# Patient Record
Sex: FEMALE | Race: WHITE | NOT HISPANIC OR LATINO | URBAN - METROPOLITAN AREA
[De-identification: names, ages, dates, MRNs, and addresses within clinical notes are randomized per-mention and may not be internally consistent; named-entity substitution may affect disease eponyms.]

---

## 2017-09-03 ENCOUNTER — EMERGENCY (EMERGENCY)
Facility: HOSPITAL | Age: 67
LOS: 1 days | Discharge: HOME | End: 2017-09-03
Attending: EMERGENCY MEDICINE | Admitting: EMERGENCY MEDICINE
Payer: MEDICARE

## 2017-09-03 VITALS
DIASTOLIC BLOOD PRESSURE: 88 MMHG | SYSTOLIC BLOOD PRESSURE: 130 MMHG | RESPIRATION RATE: 16 BRPM | TEMPERATURE: 99 F | HEART RATE: 86 BPM | OXYGEN SATURATION: 97 %

## 2017-09-03 VITALS
HEART RATE: 82 BPM | SYSTOLIC BLOOD PRESSURE: 126 MMHG | RESPIRATION RATE: 16 BRPM | DIASTOLIC BLOOD PRESSURE: 76 MMHG | OXYGEN SATURATION: 97 %

## 2017-09-03 PROCEDURE — 99283 EMERGENCY DEPT VISIT LOW MDM: CPT

## 2017-09-03 RX ORDER — VALACYCLOVIR 500 MG/1
1000 TABLET, FILM COATED ORAL ONCE
Qty: 0 | Refills: 0 | Status: COMPLETED | OUTPATIENT
Start: 2017-09-03 | End: 2017-09-03

## 2017-09-03 RX ORDER — VALACYCLOVIR 500 MG/1
1 TABLET, FILM COATED ORAL
Qty: 21 | Refills: 0 | OUTPATIENT
Start: 2017-09-03 | End: 2017-09-10

## 2017-09-03 RX ADMIN — Medication 40 MILLIGRAM(S): at 16:12

## 2017-09-03 RX ADMIN — VALACYCLOVIR 1000 MILLIGRAM(S): 500 TABLET, FILM COATED ORAL at 16:12

## 2017-09-03 NOTE — ED PROVIDER NOTE - MEDICAL DECISION MAKING DETAILS
nontoxic appearing pt, w/ cluster of rash though nonvesicular, w/o oropharyngeal or TM involvement, no facial rash, pt subjectively felt similar to past shingles, afebrile, will start antiviral and prednisone and instructed pt to follow up with PMD within 48 hours or return to ED for repeat eval nontoxic appearing pt, w/ cluster of rash though nonvesicular, w/o oropharyngeal or TM involvement, no facial rash, pt subjectively felt similar to past shingles, afebrile, will start antiviral and prednisone and instructed pt to follow up with PMD within 48 hours or return to ED for repeat eval.  soft nontender abd w/ resolution of diarrhea, low suspicion for acute surgical abd path (hx of IBD), no indication for emergent abd imaging.

## 2017-09-03 NOTE — ED ADULT NURSE NOTE - OBJECTIVE STATEMENT
Patient is a 65 y/o female presenting to the ED with left sided rash. Patient has hx of herpes zoster virus. Patient reports itchy painful rash. Patient denies fever/chills. Patient in NAD, resting comfortably, and will continue to monitor.

## 2017-09-03 NOTE — ED PROVIDER NOTE - OBJECTIVE STATEMENT
66 yof pw rash to left arm and left flank, burning/irritating x 1 day, no fc, no new exposure or new medications other than zantec and immodium for diarrhea.  no abd pain, no fc.  reports stress.  hx of shingles, felt similar.  no bleeding.  sx: rash  a/w: no fever  duration: 1 day  location: L arm and flank  radiation: none  modifying/eliciting factors: none 66 yof pw rash to left arm and left flank, burning/irritating x 1 day, no fc, no new exposure or new medications other than zantec and immodium for diarrhea.  no abd pain, no fc.  reports stress.  hx of shingles, felt similar.  no bleeding.  also diarrhea since Friday but no abd pain, no vomiting.  sx: rash  a/w: no fever  duration: 1 day  location: L arm and flank  radiation: none  modifying/eliciting factors: none

## 2017-09-03 NOTE — ED PROVIDER NOTE - PHYSICAL EXAMINATION
CON: ao x 3, HENMT: clear oropharynx, soft neck, TM clear, HEAD: atraumatic, SKIN: maculopapular rash noted to L upper arm, and also small area of similar rash to L flank, no extending erythema, no petechiae, no vesicles (T1 and T 10), no contralateral rash noted, MSK: no edema, moving all extremities spontaneously, NEURO: no gross motor or sensory deficit

## 2017-09-07 DIAGNOSIS — Z79.899 OTHER LONG TERM (CURRENT) DRUG THERAPY: ICD-10-CM

## 2017-09-07 DIAGNOSIS — R21 RASH AND OTHER NONSPECIFIC SKIN ERUPTION: ICD-10-CM

## 2017-09-07 DIAGNOSIS — Z88.1 ALLERGY STATUS TO OTHER ANTIBIOTIC AGENTS STATUS: ICD-10-CM

## 2019-09-29 ENCOUNTER — TRANSCRIPTION ENCOUNTER (OUTPATIENT)
Age: 69
End: 2019-09-29

## 2019-12-31 PROBLEM — B02.9 ZOSTER WITHOUT COMPLICATIONS: Chronic | Status: ACTIVE | Noted: 2017-09-03

## 2020-01-02 PROBLEM — Z00.00 ENCOUNTER FOR PREVENTIVE HEALTH EXAMINATION: Status: ACTIVE | Noted: 2020-01-02

## 2020-01-07 ENCOUNTER — APPOINTMENT (OUTPATIENT)
Dept: OTOLARYNGOLOGY | Facility: CLINIC | Age: 70
End: 2020-01-07
Payer: SELF-PAY

## 2020-01-07 VITALS
SYSTOLIC BLOOD PRESSURE: 157 MMHG | DIASTOLIC BLOOD PRESSURE: 96 MMHG | TEMPERATURE: 98.3 F | OXYGEN SATURATION: 98 % | HEART RATE: 79 BPM

## 2020-01-07 VITALS — BODY MASS INDEX: 28.17 KG/M2 | WEIGHT: 165 LBS | HEIGHT: 64 IN

## 2020-01-07 DIAGNOSIS — H65.199 OTHER ACUTE NONSUPPURATIVE OTITIS MEDIA, UNSPECIFIED EAR: ICD-10-CM

## 2020-01-07 DIAGNOSIS — H93.8X9 OTHER SPECIFIED DISORDERS OF EAR, UNSPECIFIED EAR: ICD-10-CM

## 2020-01-07 DIAGNOSIS — Z87.09 PERSONAL HISTORY OF OTHER DISEASES OF THE RESPIRATORY SYSTEM: ICD-10-CM

## 2020-01-07 PROCEDURE — 31231 NASAL ENDOSCOPY DX: CPT

## 2020-01-07 PROCEDURE — 99203 OFFICE O/P NEW LOW 30 MIN: CPT | Mod: 25

## 2020-01-07 PROCEDURE — 92557 COMPREHENSIVE HEARING TEST: CPT

## 2020-01-07 PROCEDURE — 92550 TYMPANOMETRY & REFLEX THRESH: CPT

## 2020-01-07 RX ORDER — METHYLPREDNISOLONE 4 MG/1
4 TABLET ORAL
Qty: 1 | Refills: 0 | Status: ACTIVE | COMMUNITY
Start: 2020-01-07 | End: 1900-01-01

## 2020-01-07 RX ORDER — AMOXICILLIN 500 MG/1
500 TABLET, FILM COATED ORAL
Qty: 14 | Refills: 0 | Status: ACTIVE | COMMUNITY
Start: 2020-01-07 | End: 1900-01-01

## 2020-01-07 NOTE — REASON FOR VISIT
[Initial Evaluation] : an initial evaluation for [FreeTextEntry2] : URI and otitis Rt ear hearing loss for 2-3 wks

## 2020-01-07 NOTE — PHYSICAL EXAM
[de-identified] : URI and otitis Rt ear hearing loss for 2-3 wks [Midline] : trachea located in midline position [Normal] : mucosa is normal

## 2020-01-21 ENCOUNTER — APPOINTMENT (OUTPATIENT)
Dept: OTOLARYNGOLOGY | Facility: CLINIC | Age: 70
End: 2020-01-21
Payer: SELF-PAY

## 2020-01-21 VITALS
SYSTOLIC BLOOD PRESSURE: 160 MMHG | DIASTOLIC BLOOD PRESSURE: 109 MMHG | HEART RATE: 84 BPM | OXYGEN SATURATION: 96 % | TEMPERATURE: 99.1 F

## 2020-01-21 DIAGNOSIS — Z86.69 PERSONAL HISTORY OF OTHER DISEASES OF THE NERVOUS SYSTEM AND SENSE ORGANS: ICD-10-CM

## 2020-01-21 PROCEDURE — 92557 COMPREHENSIVE HEARING TEST: CPT

## 2020-01-21 PROCEDURE — 92550 TYMPANOMETRY & REFLEX THRESH: CPT

## 2020-01-21 PROCEDURE — 69433 CREATE EARDRUM OPENING: CPT | Mod: RT

## 2020-01-21 PROCEDURE — 99213 OFFICE O/P EST LOW 20 MIN: CPT | Mod: 25

## 2020-01-21 RX ORDER — CIPROFLOXACIN AND DEXAMETHASONE 3; 1 MG/ML; MG/ML
0.3-0.1 SUSPENSION/ DROPS AURICULAR (OTIC)
Qty: 1 | Refills: 0 | Status: ACTIVE | COMMUNITY
Start: 2020-01-21 | End: 1900-01-01

## 2020-01-21 NOTE — PHYSICAL EXAM
[Normal] : tympanic membranes are normal in both ears [de-identified] : right ear effusion and erythema

## 2020-01-21 NOTE — HISTORY OF PRESENT ILLNESS
[de-identified] : 69F previously seen for right ear otitis media. Patient reports completion of abx with no improvement of her ear fullness and decreased hearing.

## 2020-01-21 NOTE — ASSESSMENT
[FreeTextEntry1] : 69F previously seen for ear fullness, found to have otitis media not amenable to abx, audiogram showing persistent conductive hearing loss and effusion on exam, s/p right ear in office myringotomy.\par \par Plan:\par - ciprodex gtts TID\par - f/u 1 week

## 2020-01-21 NOTE — PROCEDURE
[FreeTextEntry1] : right ear myringotomy [FreeTextEntry2] : right ear persistent otitis media and effusion [FreeTextEntry3] : Patient consented for myringotomy. All risks, benefits and alternatives explained, all questions answered. Patient positioned. Microscope used to examine the TM. TM anesthetized and scalpel used to incise it. Myringotomy tube placed. Patient tolerated procedure well.  [] : M & T [FreeTextEntry5] : Rt MnT done

## 2020-03-24 ENCOUNTER — APPOINTMENT (OUTPATIENT)
Dept: OTOLARYNGOLOGY | Facility: CLINIC | Age: 70
End: 2020-03-24

## 2020-12-23 PROBLEM — Z86.69 HISTORY OF ACUTE OTITIS MEDIA: Status: RESOLVED | Noted: 2020-01-07 | Resolved: 2020-12-23

## 2021-12-11 ENCOUNTER — TRANSCRIPTION ENCOUNTER (OUTPATIENT)
Age: 71
End: 2021-12-11

## 2022-03-30 NOTE — ED ADULT NURSE NOTE - SKIN TEMPERATURE MOISTURE
HPI Notes    Name: Yohannes Guatemalan  : 1960         Chief Complaint:     Chief Complaint   Patient presents with    Knee Pain    Hip Pain       History of Present Illness:        Latonia Redman presents to office for evaluation of pain in the Right  Hip and both knees     States yesterday could hardly get out the bed due the pain. Works at [employer] in [place]. The pain is chronic and has been present for about 2-3 yrs. Has no h/o trauma . 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 Knee Pain    The incident occurred more than 1 week ago. There was no injury mechanism. The pain is present in the right knee and left knee. The quality of the pain is described as aching. The pain is at a severity of 8/10. The pain has been fluctuating since onset. Pertinent negatives include no inability to bear weight, loss of motion, loss of sensation or muscle weakness. She reports no foreign bodies present. The symptoms are aggravated by movement. She has tried NSAIDs for the symptoms. The treatment provided moderate relief. Hip Pain    The incident occurred more than 1 week ago. There was no injury mechanism. The pain is present in the right hip. The quality of the pain is described as aching. The pain is at a severity of 8/10. The pain has been intermittent since onset. Pertinent negatives include no inability to bear weight, loss of motion, loss of sensation or muscle weakness. She reports no foreign bodies present. She has tried NSAIDs for the symptoms. The treatment provided moderate relief.            Past Medical History:     Past Medical History:   Diagnosis Date    Diabetes mellitus ([address]     Hyperlipidemia     Hypertension       Reviewed all health maintenance requirements and orderedappropriate tests  Health Maintenance Due   Topic Date Due    Diabetic retinal exam  2019    Flu vaccine (1) 2020 DX E11.9 pt tests QID 10/26/17  Yes Emily Mendez MD   calcium carbonate (OSCAL) 500 MG TABS tablet Take 500 mg by mouth daily   Yes Historical Provider, MD   Multiple Vitamins-Minerals (THERAPEUTIC MULTIVITAMIN-MINERALS) tablet Take 1 tablet by mouth daily   Yes Historical Provider, MD   Blood Glucose Monitoring Suppl MILANA 1 Units by Does not apply route once for 1 dose DX-E11.9 10/26/17 10/26/17  Emily Mendez MD        Allergies:       Patient has no known allergies. Social History:     Tobacco: reports that she quit smoking about 22 years ago. She has a 10.00 pack-year smoking history. She has never used smokeless tobacco.  Alcohol:      reports no history of alcohol use. Drug Use:  reports no history of drug use. Family History:     Family History   Problem Relation Age of Onset    No Known Problems Mother     Diabetes Father        Review of Systems:         Review of Systems   Constitutional: Negative for chills and fever. HENT: Negative for congestion and sore throat. Respiratory: Negative for cough and shortness of breath. Cardiovascular: Negative for chest pain and palpitations. Gastrointestinal: Negative for abdominal pain and nausea. Genitourinary: Negative for dysuria. Musculoskeletal: Positive for arthralgias. Negative for gait problem, joint swelling, myalgias and neck pain. Skin: Negative for rash. Neurological: Negative for headaches. Psychiatric/Behavioral: The patient is not nervous/anxious. Physical Exam:     Vitals:  /81   Pulse 82   Resp 18   Ht 5' 4\" (1.626 m)   Wt 157 lb (71.2 kg)   SpO2 98%   BMI 26.95 kg/m²       Physical Exam  Vitals signs reviewed. Constitutional:       General: She is not in acute distress. Appearance: She is well-developed. HENT:      Head: Normocephalic and atraumatic. Nose: Nose normal. No congestion. Mouth/Throat:      Pharynx: Oropharynx is clear. No posterior oropharyngeal erythema.    Eyes: Orders   1. Chronic pain of both knees  XR KNEE RIGHT (3 VIEWS)    XR KNEE LEFT (3 VIEWS)   2. Pain of right hip joint  XR HIP 2-3 VW W PELVIS RIGHT     Ana M Vasquez with chronic BL knee pain and Right hip worsening in the last 2-3 wks. Will order BL knee and R hip X-ray for evaluation. Patient prefers to continue OTC prn Ibuprofen as it helps with the pain management. Will follow up after X-ray results available. Completed Refills   Requested Prescriptions      No prescriptions requested or ordered in this encounter     Return in about 2 weeks (around 10/13/2020), or knee pain,hip pain. No orders of the defined types were placed in this encounter. Orders Placed This Encounter   Procedures    XR KNEE RIGHT (3 VIEWS)     Standing Status:   Future     Standing Expiration Date:   9/29/2021    XR KNEE LEFT (3 VIEWS)     Standing Status:   Future     Standing Expiration Date:   9/29/2021    XR HIP 2-3 VW W PELVIS RIGHT     Standing Status:   Future     Standing Expiration Date:   9/29/2021         Patient Instructions     SURVEY:    You may be receiving a survey from [employer] regarding your visit today. Please complete the survey to enable us to provide the highest quality of care to you and your family. If you cannot score us a very good on any question, please call the office to discuss how we could have made your experience a very good one. Thank you.       Electronically signed by Elliot Drew MD on 9/30/2020 at 8:26 PM           Completed Refills      Requested Prescriptions      No prescriptions requested or ordered in this encounter warm negative...